# Patient Record
(demographics unavailable — no encounter records)

---

## 2025-05-21 NOTE — IMAGING
[Right] : right hip with pelvis [FreeTextEntry9] : End-stage right hip osteoarthritis with obliteration of the joint space and erosion of the femoral head and significant osteophyte formation noted

## 2025-05-21 NOTE — ASSESSMENT
[FreeTextEntry1] : I have discussed with him that the only predictable treatment course for his level of arthritis would be total hip replacement.  He understands this We will have him consult with Dr. Castellano the hip replacement specialist

## 2025-05-21 NOTE — HISTORY OF PRESENT ILLNESS
[de-identified] : 05/21/2025: He states 2-year history of worsening right hip/groin pain.  He denies any injury.  He is ambulating with a cane and states his ability to tolerate ADLs with standing and walking and now recently dressing have become more difficult  He denies any contributory past medical history [FreeTextEntry5] : 05.21.25: Patient is a 68-year-old M complaining of pain in the groin. Pt notes after no injury he feels pain in the Rt groin area. Pt notes pain started 2 years ago, and the pain recently progressed. Pain is radiating from the buttocks to the Rt knee. No further treatment.

## 2025-05-21 NOTE — PHYSICAL EXAM
[Right] : right hip [5___] : adduction 5[unfilled]/5 [2+] : posterior tibialis pulse: 2+ [] : negative impingement maneuvers

## 2025-05-21 NOTE — DISCUSSION/SUMMARY
[de-identified] : The patient was advised of the diagnosis. The natural history of the pathology was explained in full to the patient in layman's terms. The risks and benefits of surgical and non-surgical treatment alternatives were explained in full to the patient. All questions were answered.

## 2025-06-03 NOTE — ASSESSMENT
[FreeTextEntry1] : 68 year M WITH MODERATE RT HIP PAIN. PAIN IS IN THE GROIN AND DOES NOT RADIATE. PAIN WORSENS WITH WALKING PROLONGED DISTANCES. PAIN IS AFFECTING ADL AND FUNCTIONAL ACTIVITIES, PUTTING ON SHOES AND SOCKS. XRAYS FROM 5/21/25 REVIEWED WITH ADVANED OA, LUMBAR OA. TREATMENT OPTIONS REVIEWED. QUESTIONS ANSWERED. RT SUORAV DISCUSSED AT LENGTH.   PMHx: HTN NO METAL ALLERGIES NO H/O DM NO H/O DVT/PE NO H/O MRSA/VRSA  RT SOURAV -    The patient was advised of the diagnosis. The natural history of the pathology was explained in full to the patient in layman's terms. All questions were answered. The risks and benefits of surgical and non-surgical treatment alternatives were explained in full to the patient.   We discussed my findings and the natural history of their condition. We talked about the details of the proposed surgery and the recovery. We discussed the material risks, possible benefits and alternatives to surgery. The risks include but are not limited to infection, bleeding and possible need for blood transfusion, fracture, bowel blockage, bladder retention or infection, need for reoperation, stiffness and/or limited range of motion, possible damage to nerves and blood vessels, failure of fixation of components, risk of deep vein thromboses and pulmonary embolism, wound healing problems. Although incredibly rare, we also discussed the risks of a cardiac event, stroke and even death during, or following, the surgery. We discussed the type of implants the patient will be receiving and the type of fixation that will be used, as well as whether a robot or computer navigation aide will be used. The patient understands they will need medical clearance and will attend a preoperative joint education class. We also discussed the type of anesthesia they will receive, and the risks associated with hospital or rehab length of stay, obesity, diabetes and smoking.   Patient Complains of pain in the affected joint with a level that often reaches greater than a 8/10. The pain has been progressively worsening throughout his/her treatment course. The pain has interfered with their ADLs and worsens with weight bearing. On exam they often have episodes of swelling/effusion with limited ROM. Pain worsens with ROM passive and active and I can palpate crepitus.   X- rays were reviewed with the patient and they show joint space narrowing, subchondral sclerosis, osteophyte formation, and subchondral cysts. After a period of more than 12 weeks physical therapy or exercise program done with me or another treating physician they have continued pain. The patient has failed a trial of NSAID medication or pain relievers if they were unable to tolerate NSAID medications as well as a series of injections, steroids, or hyaluronic acid. After a long discussion with the patient both the patient and I have decided we have exhausted all forms of less radical treatments and they would like to proceed with Total Joint Replacement.   Patient will plan to schedule surgery. Patient requires rolling walker and 3-in-1 commode S/P surgery. Patient currently as limited mobility that significantly impairs their ability to participate in one or more mobility related activities of daily living in the home. The patient is able to safely use the walker and the functional mobility deficit can be sufficiently resolved with the use of a walker. Patient will also be confined to one level of the home environment and there is no toilet on that level. Requires 3-in-1 commode for bedside use as patient cannot access bathroom safely in their home in timely manner. Will order rolling walker and 3-in-1 commode.

## 2025-06-03 NOTE — HISTORY OF PRESENT ILLNESS
[10] : 10 [6] : 6 [Dull/Aching] : dull/aching [Sharp] : sharp [Frequent] : frequent [Household chores] : household chores [Rest] : rest [Exercising] : exercising [de-identified] : 6/3/25: PATILEXII IS HERE FOR PAIN IN THE RIGHT HIP. PAIN STARED A YEAR AGO WHERE HE WAS CLIMBING ONTO A CRANE AND FELT A PULL IN THE GROIN AREA BUT IT WOULDNT GO AWAY. HE HAD SEEN DR. HARTMANN WHERE THEY DID X-RAYS.  [] : Post Surgical Visit: no [FreeTextEntry1] : RIGHT HIP [FreeTextEntry7] : INTO THE GROIN

## 2025-06-03 NOTE — DISCUSSION/SUMMARY
[de-identified] : I, Felicia Aragon, am scribing for Dr. Castellano in his presence for the chief complaint, physical exam, studies, assessment, and/or plan.

## 2025-06-18 NOTE — HISTORY OF PRESENT ILLNESS
[FreeTextEntry1] : 68M w htn cad presents to establish care Sent in by: edenilson PMD: Dr Chalino Elliott   pt feeling well. denies CP, SOB, at rest or on exertion. Denies palpitations, dizziness, diaphoresis, syncope, LE edema, orthopnea  Exercise: none Diet: none  pt planning R hip replacement, plan for 25 pt denies cp or sob on exertion, can ambulate up a flight of stairs without cp or sob (>4 mets) no syncope, no hx of vt vf scd no edema or orthopnea no severe valvular lesions on tte from   Prev cardiac history: none Previous cardiac testin/25: calcium score of 608 () : TTE: LV EF 65, normal RV function, mod AI.  Recent labs:  EKG:SR non specific st changes   Med hx: htn Sx hx: R elbow	 Family hx: M CHF Social hx:  lives in [place], alone, retired. +tob, working on quitting. 3 drinks (vokda/seven up) several nights a week. no drugs Meds: hydralazine Allergies: erythromycon

## 2025-06-18 NOTE — DISCUSSION/SUMMARY
[FreeTextEntry1] : 68M w htn cad presents to establish care  pt planning R hip replacement, plan for 6/20/25 pt denies cp or sob on exertion, can ambulate up a flight of stairs without cp or sob (>4 mets) no syncope, no hx of vt vf scd no edema or orthopnea no severe valvular lesions on tte from 6/25 Pt is intermediate risk for intermediate risk procedure and is optimized from a CV perspective and may proceed to procedure without further cardiac testing  f/u after sx to discuss need further w/u/discussion given hx of TOB use. discuss need for upitration of bp regimen, as use 45 min spent on complete encounter  [EKG obtained to assist in diagnosis and management of assessed problem(s)] : EKG obtained to assist in diagnosis and management of assessed problem(s)

## 2025-06-18 NOTE — PHYSICAL EXAM
[Well Developed] : well developed [Well Nourished] : well nourished [Normal Venous Pressure] : normal venous pressure [Normal S1, S2] : normal S1, S2 [No Murmur] : no murmur [Clear Lung Fields] : clear lung fields [Good Air Entry] : good air entry [No Respiratory Distress] : no respiratory distress  [Soft] : abdomen soft [Non Tender] : non-tender [Normal Gait] : normal gait [No Edema] : no edema [Moves all extremities] : moves all extremities [No Focal Deficits] : no focal deficits [Alert and Oriented] : alert and oriented

## 2025-07-08 NOTE — DISCUSSION/SUMMARY
[de-identified] : I, Felicia Aragon, am scribing for Dr. Castellano in his presence for the chief complaint, physical exam, studies, assessment, and/or plan.

## 2025-07-08 NOTE — ASSESSMENT
[FreeTextEntry1] : S/P RT SOURAV 6/20/25: DOING WELL. NO F/C/S. XRAYS REVIEWED WITH COMPONENTS WELL FIXED. NO SIGNS OF INFECTION. WE DISCUSSED THE IMPORTANCE OF ELEVATION TO REDUCE SWELLING. PROPER ELEVATION TECHNIQUES DISCUSSED. ABX AND PRECAUTIONS REVIEWED. PT RX. QUESTIONS ANSWERED.

## 2025-07-08 NOTE — HISTORY OF PRESENT ILLNESS
[6] : 6 [2] : 2 [Dull/Aching] : dull/aching [Sharp] : sharp [Frequent] : frequent [Household chores] : household chores [Rest] : rest [Exercising] : exercising [de-identified] : 6/3/25: ADELIA IS HERE FOR PAIN IN THE RIGHT HIP. PAIN STARED A YEAR AGO WHERE HE WAS CLIMBING ONTO A CRANE AND FELT A PULL IN THE GROIN AREA BUT IT WOULDNT GO AWAY. HE HAD SEEN DR. HARTMANN WHERE THEY DID X-RAYS.   7/8/25: ADELIA IS HERE FOR POST OP #1 [] : This patient has had an injection before: no [FreeTextEntry1] : RIGHT HIP [FreeTextEntry7] : INTO THE GROIN  [de-identified] : 6/20/25